# Patient Record
Sex: MALE | Race: BLACK OR AFRICAN AMERICAN | URBAN - METROPOLITAN AREA
[De-identification: names, ages, dates, MRNs, and addresses within clinical notes are randomized per-mention and may not be internally consistent; named-entity substitution may affect disease eponyms.]

---

## 2021-12-27 ENCOUNTER — WEB ENCOUNTER (OUTPATIENT)
Dept: URBAN - METROPOLITAN AREA CLINIC 105 | Facility: CLINIC | Age: 56
End: 2021-12-27

## 2021-12-27 ENCOUNTER — OFFICE VISIT (OUTPATIENT)
Dept: URBAN - METROPOLITAN AREA CLINIC 105 | Facility: CLINIC | Age: 56
End: 2021-12-27
Payer: COMMERCIAL

## 2021-12-27 ENCOUNTER — LAB OUTSIDE AN ENCOUNTER (OUTPATIENT)
Dept: URBAN - METROPOLITAN AREA CLINIC 105 | Facility: CLINIC | Age: 56
End: 2021-12-27

## 2021-12-27 VITALS
BODY MASS INDEX: 32.41 KG/M2 | TEMPERATURE: 97 F | WEIGHT: 226.4 LBS | DIASTOLIC BLOOD PRESSURE: 78 MMHG | HEIGHT: 70 IN | HEART RATE: 64 BPM | SYSTOLIC BLOOD PRESSURE: 111 MMHG

## 2021-12-27 DIAGNOSIS — Z21 ASYMPTOMATIC HIV INFECTION, WITH NO HISTORY OF HIV-RELATED ILLNESS: ICD-10-CM

## 2021-12-27 DIAGNOSIS — I10 HTN (HYPERTENSION), BENIGN: ICD-10-CM

## 2021-12-27 DIAGNOSIS — E11.9 TYPE 2 DIABETES MELLITUS WITHOUT COMPLICATION, WITHOUT LONG-TERM CURRENT USE OF INSULIN: ICD-10-CM

## 2021-12-27 DIAGNOSIS — R79.89 ABNORMAL LFTS: ICD-10-CM

## 2021-12-27 DIAGNOSIS — E66.3 OVERWEIGHT: ICD-10-CM

## 2021-12-27 DIAGNOSIS — E88.81 METABOLIC SYNDROME: ICD-10-CM

## 2021-12-27 DIAGNOSIS — E78.2 MIXED HYPERLIPIDEMIA: ICD-10-CM

## 2021-12-27 PROBLEM — 91947003: Status: ACTIVE | Noted: 2021-12-27

## 2021-12-27 PROBLEM — 237602007: Status: ACTIVE | Noted: 2021-12-27

## 2021-12-27 PROBLEM — 313436004: Status: ACTIVE | Noted: 2021-12-27

## 2021-12-27 PROCEDURE — 99244 OFF/OP CNSLTJ NEW/EST MOD 40: CPT | Performed by: INTERNAL MEDICINE

## 2021-12-27 PROCEDURE — 99204 OFFICE O/P NEW MOD 45 MIN: CPT | Performed by: INTERNAL MEDICINE

## 2021-12-27 RX ORDER — METFORMIN HYDROCHLORIDE 500 MG/1
TAKE 1 TABLET BY MOUTH EVERY DAY TABLET, EXTENDED RELEASE ORAL
Qty: 90 | Refills: 1 | Status: ACTIVE | COMMUNITY

## 2021-12-27 RX ORDER — ELVITEGRAVIR, COBICISTAT, EMTRICITABINE, AND TENOFOVIR ALAFENAMIDE 150; 150; 200; 10 MG/1; MG/1; MG/1; MG/1
TAKE 1 TABLET BY MOUTH EVERY DAY TABLET ORAL
Qty: 30 | Refills: 0 | Status: ACTIVE | COMMUNITY

## 2021-12-27 RX ORDER — ROSUVASTATIN 20 MG/1
TAKE 1 TABLET BY MOUTH EVERY DAY TABLET, FILM COATED ORAL
Qty: 90 | Refills: 0 | Status: ACTIVE | COMMUNITY

## 2021-12-27 RX ORDER — ELVITEGRAVIR, COBICISTAT, EMTRICITABINE, AND TENOFOVIR DISOPROXIL FUMARATE 150; 150; 200; 300 MG/1; MG/1; MG/1; MG/1
TAKE 1 TABLET BY ORAL ROUTE ONCE DAILY WITH FOOD TABLET, FILM COATED ORAL 1
Qty: 0 | Refills: 0 | Status: ON HOLD | COMMUNITY
Start: 1900-01-01

## 2021-12-27 RX ORDER — NEBIVOLOL HYDROCHLORIDE 5 MG/1
TAKE 1 TABLET BY MOUTH EVERY DAY TABLET ORAL
Qty: 90 | Refills: 1 | Status: ACTIVE | COMMUNITY

## 2021-12-27 NOTE — HPI-TODAY'S VISIT:
Patient comes on referral from Dr. Michoacano Sharif. a copy of the consultation will be sent to the referring physician. Patient comes for evaluation of abnormal liver enzymes yet no liver enzymes were sent for me to review. he says that he does consume 2-3 glasses of white wine every evening after work. no FH of liver problems. he takes medication for HTN, DM, and hyperlipidemia.

## 2022-01-03 LAB
ACTIN (SMOOTH MUSCLE) ANTIBODY: 7
ALBUMIN: 5
ALKALINE PHOSPHATASE: 99
ALPHA-1-ANTITRYPSIN, SERUM: 115
ALT (SGPT): 26
ANA DIRECT: NEGATIVE
AST (SGOT): 40
BILIRUBIN, DIRECT: <0.1
BILIRUBIN, TOTAL: 0.2
CERULOPLASMIN: 21
CREATINE KINASE,TOTAL: 322
FERRITIN, SERUM: 146
HBSAG SCREEN: NEGATIVE
HEP C VIRUS AB: 0.3
HEREDITARY  HEMOCHROMATOSIS: (no result)
IRON BIND.CAP.(TIBC): 356
IRON SATURATION: 17
IRON: 60
LIVER-KIDNEY MICROSOMAL AB: 0.9
MITOCHONDRIAL (M2) ANTIBODY: <20
PHENOTYPE (PI): (no result)
PROTEIN, TOTAL: 7.8
UIBC: 296

## 2022-01-11 ENCOUNTER — OFFICE VISIT (OUTPATIENT)
Dept: URBAN - METROPOLITAN AREA CLINIC 91 | Facility: CLINIC | Age: 57
End: 2022-01-11
Payer: COMMERCIAL

## 2022-01-11 DIAGNOSIS — K76.0 HEPATIC STEATOSIS: ICD-10-CM

## 2022-01-11 PROCEDURE — 76705 ECHO EXAM OF ABDOMEN: CPT | Performed by: INTERNAL MEDICINE

## 2022-01-11 RX ORDER — ELVITEGRAVIR, COBICISTAT, EMTRICITABINE, AND TENOFOVIR DISOPROXIL FUMARATE 150; 150; 200; 300 MG/1; MG/1; MG/1; MG/1
TAKE 1 TABLET BY ORAL ROUTE ONCE DAILY WITH FOOD TABLET, FILM COATED ORAL 1
Qty: 0 | Refills: 0 | Status: ON HOLD | COMMUNITY
Start: 1900-01-01

## 2022-01-11 RX ORDER — ELVITEGRAVIR, COBICISTAT, EMTRICITABINE, AND TENOFOVIR ALAFENAMIDE 150; 150; 200; 10 MG/1; MG/1; MG/1; MG/1
TAKE 1 TABLET BY MOUTH EVERY DAY TABLET ORAL
Qty: 30 | Refills: 0 | Status: ACTIVE | COMMUNITY

## 2022-01-11 RX ORDER — METFORMIN HYDROCHLORIDE 500 MG/1
TAKE 1 TABLET BY MOUTH EVERY DAY TABLET, EXTENDED RELEASE ORAL
Qty: 90 | Refills: 1 | Status: ACTIVE | COMMUNITY

## 2022-01-11 RX ORDER — NEBIVOLOL HYDROCHLORIDE 5 MG/1
TAKE 1 TABLET BY MOUTH EVERY DAY TABLET ORAL
Qty: 90 | Refills: 1 | Status: ACTIVE | COMMUNITY

## 2022-01-11 RX ORDER — ROSUVASTATIN 20 MG/1
TAKE 1 TABLET BY MOUTH EVERY DAY TABLET, FILM COATED ORAL
Qty: 90 | Refills: 0 | Status: ACTIVE | COMMUNITY

## 2022-03-04 ENCOUNTER — OFFICE VISIT (OUTPATIENT)
Dept: URBAN - METROPOLITAN AREA CLINIC 105 | Facility: CLINIC | Age: 57
End: 2022-03-04

## 2022-09-14 ENCOUNTER — DASHBOARD ENCOUNTERS (OUTPATIENT)
Age: 57
End: 2022-09-14

## 2022-09-14 ENCOUNTER — OFFICE VISIT (OUTPATIENT)
Dept: URBAN - METROPOLITAN AREA CLINIC 105 | Facility: CLINIC | Age: 57
End: 2022-09-14
Payer: COMMERCIAL

## 2022-09-14 VITALS — HEIGHT: 70 IN

## 2022-09-14 DIAGNOSIS — K76.0 FATTY LIVER: ICD-10-CM

## 2022-09-14 DIAGNOSIS — K75.81 STEATOHEPATITIS: ICD-10-CM

## 2022-09-14 DIAGNOSIS — B20 HIV INFECTION, UNSPECIFIED SYMPTOM STATUS: ICD-10-CM

## 2022-09-14 DIAGNOSIS — I10 HTN (HYPERTENSION), BENIGN: ICD-10-CM

## 2022-09-14 DIAGNOSIS — E78.2 MIXED HYPERLIPIDEMIA: ICD-10-CM

## 2022-09-14 DIAGNOSIS — E66.3 OVERWEIGHT: ICD-10-CM

## 2022-09-14 PROBLEM — 442191002: Status: ACTIVE | Noted: 2022-09-14

## 2022-09-14 PROBLEM — 197321007: Status: ACTIVE | Noted: 2022-09-14

## 2022-09-14 PROBLEM — 86406008: Status: ACTIVE | Noted: 2022-09-14

## 2022-09-14 PROBLEM — 10725009: Status: ACTIVE | Noted: 2021-12-27

## 2022-09-14 PROBLEM — 267434003: Status: ACTIVE | Noted: 2021-12-27

## 2022-09-14 PROCEDURE — 99214 OFFICE O/P EST MOD 30 MIN: CPT | Performed by: INTERNAL MEDICINE

## 2022-09-14 RX ORDER — ROSUVASTATIN 20 MG/1
TAKE 1 TABLET BY MOUTH EVERY DAY TABLET, FILM COATED ORAL
Qty: 90 | Refills: 0 | Status: ACTIVE | COMMUNITY

## 2022-09-14 RX ORDER — ELVITEGRAVIR, COBICISTAT, EMTRICITABINE, AND TENOFOVIR ALAFENAMIDE 150; 150; 200; 10 MG/1; MG/1; MG/1; MG/1
TAKE 1 TABLET BY MOUTH EVERY DAY TABLET ORAL
Qty: 30 | Refills: 0 | Status: ACTIVE | COMMUNITY

## 2022-09-14 RX ORDER — METFORMIN HYDROCHLORIDE 500 MG/1
TAKE 1 TABLET BY MOUTH EVERY DAY TABLET, EXTENDED RELEASE ORAL
Qty: 90 | Refills: 1 | Status: ACTIVE | COMMUNITY

## 2022-09-14 RX ORDER — ELVITEGRAVIR, COBICISTAT, EMTRICITABINE, AND TENOFOVIR DISOPROXIL FUMARATE 150; 150; 200; 300 MG/1; MG/1; MG/1; MG/1
TAKE 1 TABLET BY ORAL ROUTE ONCE DAILY WITH FOOD TABLET, FILM COATED ORAL 1
Qty: 0 | Refills: 0 | Status: ON HOLD | COMMUNITY
Start: 1900-01-01

## 2022-09-14 RX ORDER — NEBIVOLOL HYDROCHLORIDE 5 MG/1
TAKE 1 TABLET BY MOUTH EVERY DAY TABLET ORAL
Qty: 90 | Refills: 1 | Status: ACTIVE | COMMUNITY

## 2022-09-14 NOTE — HPI-TODAY'S VISIT:
Patient comes on referral from Dr. Michoacano Sharif. a copy of the consultation will be sent to the referring physician. Patient comes for evaluation of abnormal liver enzymes yet no liver enzymes were sent for me to review. he says that he does consume 2-3 glasses of white wine every evening after work. no FH of liver problems. he takes medication for HTN, DM, and hyperlipidemia. -  09/14/2022: At his last visit we did a complete serological workup to evaluate for autoimmune genetic and viral liver diseases.  The workup was negative.  Right upper quadrant ultrasound did show significant a patent steatosis.  He does endorse drinking 2-3 glasses of white wine just about every evening.

## 2022-09-15 LAB
ALBUMIN/GLOBULIN RATIO: 1.6
ALBUMIN: 4.4
ALKALINE PHOSPHATASE: 83
ALT (SGPT): 23
AST (SGOT): 27
BILIRUBIN, DIRECT: 0.1
BILIRUBIN, INDIRECT: 0.3
BILIRUBIN, TOTAL: 0.4
GLOBULIN: 2.7
PROTEIN, TOTAL: 7.1

## 2023-03-15 ENCOUNTER — OFFICE VISIT (OUTPATIENT)
Dept: URBAN - METROPOLITAN AREA CLINIC 105 | Facility: CLINIC | Age: 58
End: 2023-03-15